# Patient Record
Sex: FEMALE | Race: BLACK OR AFRICAN AMERICAN | NOT HISPANIC OR LATINO
[De-identification: names, ages, dates, MRNs, and addresses within clinical notes are randomized per-mention and may not be internally consistent; named-entity substitution may affect disease eponyms.]

---

## 2020-08-04 PROBLEM — Z00.00 ENCOUNTER FOR PREVENTIVE HEALTH EXAMINATION: Status: ACTIVE | Noted: 2020-08-04

## 2020-08-11 ENCOUNTER — APPOINTMENT (OUTPATIENT)
Dept: PULMONOLOGY | Facility: CLINIC | Age: 20
End: 2020-08-11
Payer: MEDICAID

## 2020-08-11 VITALS
BODY MASS INDEX: 24.48 KG/M2 | TEMPERATURE: 98.6 F | DIASTOLIC BLOOD PRESSURE: 74 MMHG | SYSTOLIC BLOOD PRESSURE: 90 MMHG | HEART RATE: 101 BPM | HEIGHT: 62 IN | OXYGEN SATURATION: 98 % | WEIGHT: 133 LBS

## 2020-08-11 DIAGNOSIS — Z87.09 PERSONAL HISTORY OF OTHER DISEASES OF THE RESPIRATORY SYSTEM: ICD-10-CM

## 2020-08-11 DIAGNOSIS — Z91.09 OTHER ALLERGY STATUS, OTHER THAN TO DRUGS AND BIOLOGICAL SUBSTANCES: ICD-10-CM

## 2020-08-11 DIAGNOSIS — Z80.1 FAMILY HISTORY OF MALIGNANT NEOPLASM OF TRACHEA, BRONCHUS AND LUNG: ICD-10-CM

## 2020-08-11 PROCEDURE — 99244 OFF/OP CNSLTJ NEW/EST MOD 40: CPT

## 2020-08-11 RX ORDER — ALBUTEROL 90 MCG
90 AEROSOL (GRAM) INHALATION
Refills: 0 | Status: ACTIVE | COMMUNITY

## 2020-08-11 NOTE — PHYSICAL EXAM
[No Acute Distress] : no acute distress [Normal Appearance] : normal appearance [Normal S1, S2] : normal s1, s2 [Normal Rate/Rhythm] : normal rate/rhythm [No Resp Distress] : no resp distress [No Murmurs] : no murmurs [Benign] : benign [No Clubbing] : no clubbing [Clear to Auscultation Bilaterally] : clear to auscultation bilaterally [Normal Color/ Pigmentation] : normal color/ pigmentation [No Edema] : no edema [Normal Affect] : normal affect

## 2020-08-19 NOTE — ASSESSMENT
[FreeTextEntry1] : Data reviewed:\par \par No data provided\par \par Impression:\par Incidental lung nodules of unknown size in young nonsmoker\par \par Plan:\par We need the report/images from the OSH to know how to proceed. This is a very low risk patient, obviously. We will follow the Fleischner guidelines. We had her sign a release to obtain the data.\par --\par Outside scan 7/2020 rec'd and rev'd: tiny nodules 4-5mm, spoke to pt, no follow up needed per Kathi
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
## 2020-08-19 NOTE — CONSULT LETTER
[Dear  ___] : Dear ~LUZ MARINA, [( Thank you for referring [unfilled] for consultation for _____ )] : Thank you for referring [unfilled] for consultation for [unfilled] [Please see my note below.] : Please see my note below. [Consult Closing:] : Thank you very much for allowing me to participate in the care of this patient.  If you have any questions, please do not hesitate to contact me. [Sincerely,] : Sincerely, [FreeTextEntry2] : Claudia Aguilar NP\par [address] \[place] [address] [FreeTextEntry3] : Vaishali Gipson MD, FCCP\par